# Patient Record
Sex: MALE | Race: AMERICAN INDIAN OR ALASKA NATIVE | Employment: FULL TIME | URBAN - METROPOLITAN AREA
[De-identification: names, ages, dates, MRNs, and addresses within clinical notes are randomized per-mention and may not be internally consistent; named-entity substitution may affect disease eponyms.]

---

## 2017-05-25 ENCOUNTER — EMPLOYEE WELLNESS (OUTPATIENT)
Dept: OTHER | Age: 37
End: 2017-05-25

## 2017-05-25 LAB
CHOLESTEROL, TOTAL: 143 MG/DL (ref 0–199)
FASTING: YES
GLUCOSE BLD-MCNC: 103 MG/DL (ref 74–109)
HDLC SERPL-MCNC: 46 MG/DL (ref 40–90)
LDL CHOLESTEROL CALCULATED: 83 MG/DL
TRIGL SERPL-MCNC: 69 MG/DL (ref 0–199)

## 2018-03-20 VITALS — WEIGHT: 181 LBS

## 2018-05-10 ENCOUNTER — EMPLOYEE WELLNESS (OUTPATIENT)
Dept: OTHER | Age: 38
End: 2018-05-10

## 2018-05-10 LAB
CHOLESTEROL, TOTAL: 144 MG/DL (ref 0–199)
FASTING: YES
GLUCOSE BLD-MCNC: 100 MG/DL (ref 74–109)
HDLC SERPL-MCNC: 47 MG/DL (ref 40–90)
LDL CHOLESTEROL CALCULATED: 86 MG/DL
TRIGL SERPL-MCNC: 54 MG/DL (ref 0–199)

## 2018-05-21 VITALS — WEIGHT: 179 LBS

## 2019-05-15 ENCOUNTER — EMPLOYEE WELLNESS (OUTPATIENT)
Dept: OTHER | Age: 39
End: 2019-05-15

## 2019-05-15 LAB
CHOLESTEROL, TOTAL: 146 MG/DL (ref 0–199)
FASTING: YES
GLUCOSE BLD-MCNC: 108 MG/DL (ref 74–109)
HDLC SERPL-MCNC: 55 MG/DL (ref 40–90)
LDL CHOLESTEROL CALCULATED: 82 MG/DL
TRIGL SERPL-MCNC: 43 MG/DL (ref 0–199)

## 2019-06-03 VITALS — WEIGHT: 177 LBS

## 2020-01-11 ENCOUNTER — HOSPITAL ENCOUNTER (OUTPATIENT)
Dept: CT IMAGING | Age: 40
Discharge: HOME | End: 2020-01-11
Payer: COMMERCIAL

## 2020-01-11 PROCEDURE — 70486 CT MAXILLOFACIAL W/O DYE: CPT

## 2020-04-23 ENCOUNTER — HOSPITAL ENCOUNTER (OUTPATIENT)
Dept: PHYSICAL THERAPY | Age: 40
Setting detail: THERAPIES SERIES
Discharge: HOME | End: 2020-04-23
Payer: COMMERCIAL

## 2020-04-23 PROCEDURE — 97161 PT EVAL LOW COMPLEX 20 MIN: CPT

## 2020-04-23 PROCEDURE — 97035 APP MDLTY 1+ULTRASOUND EA 15: CPT

## 2020-04-23 ASSESSMENT — PAIN DESCRIPTION - DESCRIPTORS: DESCRIPTORS: DULL;ACHING

## 2020-04-23 ASSESSMENT — PAIN DESCRIPTION - LOCATION: LOCATION: GROIN;HIP

## 2020-04-23 ASSESSMENT — PAIN DESCRIPTION - ORIENTATION: ORIENTATION: RIGHT

## 2020-04-23 ASSESSMENT — PAIN DESCRIPTION - PAIN TYPE: TYPE: ACUTE PAIN

## 2020-04-23 ASSESSMENT — PAIN DESCRIPTION - FREQUENCY: FREQUENCY: INTERMITTENT

## 2020-04-23 ASSESSMENT — PAIN SCALES - GENERAL: PAINLEVEL_OUTOF10: 3

## 2020-04-29 ENCOUNTER — HOSPITAL ENCOUNTER (OUTPATIENT)
Dept: PHYSICAL THERAPY | Age: 40
Setting detail: THERAPIES SERIES
Discharge: HOME | End: 2020-04-29
Payer: COMMERCIAL

## 2020-04-29 PROCEDURE — 97035 APP MDLTY 1+ULTRASOUND EA 15: CPT

## 2020-04-29 PROCEDURE — 97140 MANUAL THERAPY 1/> REGIONS: CPT

## 2020-04-29 ASSESSMENT — PAIN DESCRIPTION - ORIENTATION: ORIENTATION: RIGHT

## 2020-04-29 ASSESSMENT — PAIN DESCRIPTION - PAIN TYPE: TYPE: ACUTE PAIN

## 2020-04-29 ASSESSMENT — PAIN SCALES - GENERAL: PAINLEVEL_OUTOF10: 3

## 2020-04-29 ASSESSMENT — PAIN DESCRIPTION - LOCATION: LOCATION: GROIN;HIP

## 2020-04-30 ENCOUNTER — HOSPITAL ENCOUNTER (OUTPATIENT)
Dept: PHYSICAL THERAPY | Age: 40
Setting detail: THERAPIES SERIES
Discharge: HOME | End: 2020-04-30
Payer: COMMERCIAL

## 2020-04-30 PROCEDURE — 97035 APP MDLTY 1+ULTRASOUND EA 15: CPT

## 2020-04-30 PROCEDURE — 97140 MANUAL THERAPY 1/> REGIONS: CPT

## 2020-04-30 ASSESSMENT — PAIN SCALES - GENERAL: PAINLEVEL_OUTOF10: 3

## 2020-04-30 ASSESSMENT — PAIN DESCRIPTION - PAIN TYPE: TYPE: ACUTE PAIN

## 2020-04-30 ASSESSMENT — PAIN DESCRIPTION - ORIENTATION: ORIENTATION: RIGHT

## 2020-04-30 ASSESSMENT — PAIN DESCRIPTION - LOCATION: LOCATION: HIP

## 2020-05-08 ENCOUNTER — HOSPITAL ENCOUNTER (OUTPATIENT)
Dept: PHYSICAL THERAPY | Age: 40
Setting detail: THERAPIES SERIES
Discharge: HOME | End: 2020-05-08
Payer: COMMERCIAL

## 2020-05-08 PROCEDURE — 97110 THERAPEUTIC EXERCISES: CPT

## 2020-05-08 PROCEDURE — 97035 APP MDLTY 1+ULTRASOUND EA 15: CPT

## 2020-05-08 ASSESSMENT — PAIN DESCRIPTION - PAIN TYPE: TYPE: CHRONIC PAIN

## 2020-05-08 ASSESSMENT — PAIN DESCRIPTION - ORIENTATION: ORIENTATION: LEFT

## 2020-05-08 ASSESSMENT — PAIN DESCRIPTION - LOCATION: LOCATION: KNEE

## 2020-05-08 ASSESSMENT — PAIN SCALES - GENERAL: PAINLEVEL_OUTOF10: 5

## 2020-05-13 ENCOUNTER — HOSPITAL ENCOUNTER (OUTPATIENT)
Dept: PHYSICAL THERAPY | Age: 40
Setting detail: THERAPIES SERIES
Discharge: HOME | End: 2020-05-13
Payer: COMMERCIAL

## 2020-05-13 PROCEDURE — 97140 MANUAL THERAPY 1/> REGIONS: CPT

## 2020-05-13 PROCEDURE — 97110 THERAPEUTIC EXERCISES: CPT

## 2020-05-13 PROCEDURE — 97035 APP MDLTY 1+ULTRASOUND EA 15: CPT

## 2020-05-13 ASSESSMENT — PAIN DESCRIPTION - ORIENTATION: ORIENTATION: LEFT

## 2020-05-13 ASSESSMENT — PAIN DESCRIPTION - PAIN TYPE: TYPE: CHRONIC PAIN

## 2020-05-13 ASSESSMENT — PAIN SCALES - GENERAL: PAINLEVEL_OUTOF10: 10

## 2020-05-13 ASSESSMENT — PAIN DESCRIPTION - LOCATION: LOCATION: KNEE

## 2020-05-13 NOTE — PROGRESS NOTES
[place]     Time In: 4816  Time Out: 0740  Minutes: 38  Timed Code Treatment Minutes: 38 Minutes                Date: 2020  Patient Name: Caity Lim,  Gender:  male        CSN: [id number]   : 1980  (35 y.o.)       Referring Practitioner: Dr Doris Hutchison      Diagnosis: M70.61 (ICD-10-CM) - Trochanteric bursitis, right hip  Treatment Diagnosis: right hip pain, difficulty with ambulation, right leg tightness, left knee pain, left leg weakness   Additional Pertinent Hx: No restrictions from doctor. Hx: No significant issues                  General:  PT Visit Information  PT Insurance Information: Medical [place] - No visit limit. Aquatics and modalities covered. Total # of Visits to Date: 5  Plan of Care/Certification Expiration Date: 20  Progress Note Counter:  for PN               Subjective:  Chart Reviewed: Yes  Patient assessed for rehabilitation services?: Yes  Family / Caregiver Present: No  Comments: Follow up with doctor as needed     Subjective: Patient reports was doing well over the weekend and then yesterday started having some increased soreness in left knee and then this morning started having 10/10 in one spot on lateral left knee that is 10/10 and no sure why it is hurting. States no hip pain today. Pain:  Patient Currently in Pain: Yes  Pain Assessment: 0-10  Pain Level: 10  Pain Type: Chronic pain  Pain Location: Knee  Pain Orientation: Left      Objective    Exercises  Exercise 2: 1-2 minutes each psoas release on right at 45 and 90 degree angles - minimal tightness noted and with patient report of pain only at 90 deg angle  Exercise 3: US to left lateral knee - 3.3 MHz 0.8 W/cm2 8 minutes 50% pulsed  Exercise 6: Core/VMO strengthening - abdominal bracing 10x 5 seconds, adduction squeezes 10x5 seconds, glut sets 10x 5 seconds, Abduction with peach band right only 10x.  HEP as can tolerate - adduction squeeze with wall squat and limited range LAQ, bridge with adduction squeeze  Exercise 7: Re-educated on importance of IT band stretch on left. Ice to left knee as able. Exercise 8: Manual therapy with trigger point work to left IT band with knee in flexion and patient in supine - tightness and knotting  noted throughout. Exercise 9: Star pattern technique used with 4 (2 block) strips over lateral distal IT band insertion to help decrease stress on area. Activity Tolerance:  Activity Tolerance: Patient Tolerated treatment well  Activity Tolerance: Patient reports left knee feeling better after session but still painful. Assessment: Body structures, Functions, Activity limitations: Decreased functional mobility , Decreased ROM, Decreased strength, Decreased endurance, Increased pain  Assessment: Patient's right hip pain aboilished today but still had minor pain with psoas release at 90 degree angle. Patient's increased left knee pain could be due to pulling on attachment site of IT band in conjunction with starting strengthening for VMO on same knee. Need to work on pain contorl while patient is doing strengthening and stretching. Prognosis: Good  Discharge Recommendations: Continue to assess pending progress    Patient Education:  Patient Education: Continue with HEP but stop if causing pain. Increase IT band stretch for left leg. Ice as needed. See day does with taping.                       Plan:  Times per week: 2x/week  Plan weeks: 6 weeks  Specific instructions for Next Treatment: US to left knee then hip as needed, manual therapy to IT bands, stretching, strengthening giancarlo left VMO, manual therapy to right psoas  Current Treatment Recommendations: Strengthening, ROM, Functional Mobility Training, Gait Training, Neuromuscular Re-education, Home Exercise Program, Modalities, Manual Therapy - Soft Tissue Mobilization, Pain Management  Plan Comment: Continue with

## 2020-05-14 ENCOUNTER — HOSPITAL ENCOUNTER (OUTPATIENT)
Dept: PHYSICAL THERAPY | Age: 40
Setting detail: THERAPIES SERIES
Discharge: HOME | End: 2020-05-14
Payer: COMMERCIAL

## 2020-05-14 PROCEDURE — 97035 APP MDLTY 1+ULTRASOUND EA 15: CPT

## 2020-05-14 ASSESSMENT — PAIN DESCRIPTION - ORIENTATION: ORIENTATION: LEFT

## 2020-05-14 ASSESSMENT — PAIN DESCRIPTION - LOCATION: LOCATION: KNEE

## 2020-05-14 ASSESSMENT — PAIN DESCRIPTION - PAIN TYPE: TYPE: CHRONIC PAIN

## 2020-05-14 ASSESSMENT — PAIN SCALES - GENERAL: PAINLEVEL_OUTOF10: 7

## 2020-05-14 NOTE — PROGRESS NOTES
[place]     Time In: 730  Time Out: 3789  Minutes: 34  Timed Code Treatment Minutes: 34 Minutes                Date: 2020  Patient Name: Monica Toussaint,  Gender:  male        CSN: [id number]   : 1980  (35 y.o.)       Referring Practitioner: Dr Ronit Johnson      Diagnosis: M70.61 (ICD-10-CM) - Trochanteric bursitis, right hip  Treatment Diagnosis: right hip pain, difficulty with ambulation, right leg tightness, left knee pain, left leg weakness   Additional Pertinent Hx: No restrictions from doctor. Hx: No significant issues                  General:  PT Visit Information  Onset Date: 20  PT Insurance Information: Medical [place] - No visit limit. Aquatics and modalities covered. Total # of Visits to Date: 6  Plan of Care/Certification Expiration Date: 20  Progress Note Counter:  for PN               Subjective:  Chart Reviewed: Yes  Patient assessed for rehabilitation services?: Yes  Family / Caregiver Present: No  Comments: Follow up with doctor as needed  Other (Comment): New script for addition of maltracking of left patella and IT band syndrome left leg     Subjective: Patient reports that thet left lateral knee region has been very flared up. Noting temporary decreased pain symptoms along left lateral knee till the afternoon. But as day went on he felt as if pain was increasing and coming back. He has had to use Aspercreme and Ibuprofen. Last took Ibuprofen 3 tablets 200 mg each this morning at 5:30. Noted no skin irritation from tape.        Pain:  Patient Currently in Pain: Yes  Pain Assessment: 0-10  Pain Level: 7  Pain Type: Chronic pain  Pain Location: Knee  Pain Orientation: Left      Objective    Exercises  Exercise 3: US to left lateral knee, hamstring and IT band insertion lateral tibia and slightly posteriorly.  - 3.3 MHz 0.8 W/cm2 8 minutes 50% pulsed  Exercise 8: Manual therapy

## 2020-05-19 ENCOUNTER — HOSPITAL ENCOUNTER (OUTPATIENT)
Dept: PHYSICAL THERAPY | Age: 40
Setting detail: THERAPIES SERIES
Discharge: HOME | End: 2020-05-19
Payer: COMMERCIAL

## 2020-05-19 PROCEDURE — 97110 THERAPEUTIC EXERCISES: CPT

## 2020-05-19 PROCEDURE — 97035 APP MDLTY 1+ULTRASOUND EA 15: CPT

## 2020-05-19 ASSESSMENT — PAIN DESCRIPTION - PAIN TYPE: TYPE: CHRONIC PAIN

## 2020-05-19 ASSESSMENT — PAIN DESCRIPTION - ORIENTATION: ORIENTATION: RIGHT;LEFT

## 2020-05-19 ASSESSMENT — PAIN DESCRIPTION - LOCATION: LOCATION: HIP;KNEE

## 2020-05-19 ASSESSMENT — PAIN SCALES - GENERAL: PAINLEVEL_OUTOF10: 2

## 2020-05-19 NOTE — PROGRESS NOTES
[place]     Time In: 0700  Time Out: 0730  Minutes: 30  Timed Code Treatment Minutes: 30 Minutes                Date: 2020  Patient Name: Mercedes Lorenzo,  Gender:  male        CSN: [id number]   : 1980  (35 y.o.)       Referring Practitioner: Dr Kimberley Quintanilla      Diagnosis: M70.61 (ICD-10-CM) - Trochanteric bursitis, right hip  Treatment Diagnosis: right hip pain, difficulty with ambulation, right leg tightness, left knee pain, left leg weakness   Additional Pertinent Hx: No restrictions from doctor. Hx: No significant issues                  General:  PT Visit Information  PT Insurance Information: Medical [place] - No visit limit. Aquatics and modalities covered. Total # of Visits to Date: 7  Plan of Care/Certification Expiration Date: 20  Progress Note Counter: 3/8 for PN               Subjective:  Chart Reviewed: Yes  Patient assessed for rehabilitation services?: Yes  Family / Caregiver Present: No  Comments: Follow up with doctor as needed     Subjective: Patient reports is starting to feel better.  talked to doctor last Thursday and he told him was just a flare up of his IT band and to use US and support.  had a very painful weekend and could not do much. States used ice and started feeling better  then yesterday was much better and doing well today. States right hip is a little flared up from putting all his weight through that leg. Pain:  Patient Currently in Pain: Yes  Pain Assessment: 0-10  Pain Level: 2  Pain Type: Chronic pain  Pain Location: Hip, Knee  Pain Orientation: Right, Left      Objective    Exercises  Exercise 2: 1-2 minutes each psoas release on right at 45 and 90 degree angles - moderate tightness noted and with patient report of pain only at 90 deg angle  Exercise 3: US to left lateral knee, hamstring and IT band insertion lateral tibia and slightly posteriorly. - 3.3 MHz 0.8 W/cm2 10 minutes 50% pulsed  Exercise 7: Educated on returning to stretches but hold on strengthening for another couple of days  Exercise 10: Trialed light adduction squeezes with legs straight but caused pain in left hip so stopped. Performed standing HS stretch bilat 3x15 seconds and IT band stretch not crossing leg over the other just lef hip leaning towards the wall 3x15 seconds. Seated right hip flexor stretch (standing caused pain in left knee) 3x15 seconds. Activity Tolerance:  Activity Tolerance: Patient Tolerated treatment well, Patient limited by pain  Activity Tolerance: Patient reported only pain with WB on left after session and was minimal.    Assessment: Body structures, Functions, Activity limitations: Decreased functional mobility , Decreased ROM, Decreased strength, Decreased endurance, Increased pain  Assessment: Patient still with IT band flare up on left side. Need to be careful progressing back to exercises. Slowly work back into stretching and keep working on pain relief mechanisms. Prognosis: Good  Discharge Recommendations: Continue to assess pending progress    Patient Education:  Patient Education: Continue to ice and monitor pain. Do not start back to exercises for a couple more days and only if is pain free. Slowly start stretches. Can take tape off tonight                      Plan:  Times per week: 2x/week  Plan weeks: 6 weeks  Specific instructions for Next Treatment: US to left knee then hip as needed, manual therapy to IT bands, stretching, strengthening giancarlo left VMO, manual therapy to right psoas  Current Treatment Recommendations: Strengthening, ROM, Functional Mobility Training, Gait Training, Neuromuscular Re-education, Home Exercise Program, Modalities, Manual Therapy - Soft Tissue Mobilization, Pain Management  Plan Comment: Continue with POC    Goals:  Patient goals :  To not have the frequency of pain in his right hip/groin and no pain in left

## 2020-05-21 ENCOUNTER — HOSPITAL ENCOUNTER (OUTPATIENT)
Dept: PHYSICAL THERAPY | Age: 40
Setting detail: THERAPIES SERIES
Discharge: HOME | End: 2020-05-21
Payer: COMMERCIAL

## 2020-05-21 PROCEDURE — 97035 APP MDLTY 1+ULTRASOUND EA 15: CPT

## 2020-05-21 PROCEDURE — 97140 MANUAL THERAPY 1/> REGIONS: CPT

## 2020-05-21 ASSESSMENT — PAIN DESCRIPTION - LOCATION: LOCATION: KNEE

## 2020-05-21 ASSESSMENT — PAIN DESCRIPTION - ORIENTATION: ORIENTATION: LEFT

## 2020-05-21 ASSESSMENT — PAIN SCALES - GENERAL: PAINLEVEL_OUTOF10: 3

## 2020-05-21 ASSESSMENT — PAIN DESCRIPTION - PAIN TYPE: TYPE: CHRONIC PAIN

## 2020-05-21 NOTE — PROGRESS NOTES
periods  Short term goal 3: Patient to demonstrate full right IT band range bilaterally for decreased pull on right lateral hip and left knee for ease with walking. Short term goal 4: Patient to demonstrate 4-5/5 strength in left VMO and 5/5 strength in right hip to be able to use reciprocal pattern without popping on the stairs  Short term goal 5: Patient to be fully educated on all stretching and strengthening needed for HEP. Long term goals  Time Frame for Long term goals : 6 weeks  Long term goal 1: Patient to be independent with home program to be able to perform all daily acitivty without increased hip pain.     [address], [address] [place] [address]

## 2020-05-26 ENCOUNTER — HOSPITAL ENCOUNTER (OUTPATIENT)
Dept: PHYSICAL THERAPY | Age: 40
Setting detail: THERAPIES SERIES
Discharge: HOME | End: 2020-05-26
Payer: COMMERCIAL

## 2020-05-26 PROCEDURE — 97110 THERAPEUTIC EXERCISES: CPT

## 2020-05-26 PROCEDURE — 97035 APP MDLTY 1+ULTRASOUND EA 15: CPT

## 2020-05-26 NOTE — PROGRESS NOTES
hamstring and distal IT band x10 minutes 1.0 MHz 1.4 W/cm2 cont to use deep heat to warm up muscle tissue for relaxation of tension. Exercise 5: Supine adduction squeezes and quad sets 10x5 seconds bilat - no pain         Activity Tolerance:  Activity Tolerance: Patient Tolerated treatment well  Activity Tolerance: Patient without pain after session    Assessment: Body structures, Functions, Activity limitations: Decreased functional mobility , Decreased ROM, Decreased strength, Decreased endurance, Increased pain  Assessment: Patient still with tension noted distal IT band but not as tender to touch today. Patient with little to no pain in the last 4 days. Will see how feels after different use of US today and slow return to stretches and strengthening. May benefit from dry needling. Prognosis: Good  Discharge Recommendations: Continue to assess pending progress    Patient Education:  Patient Education: See how feels after treatment today. Slow return to stretches and strengthening and make sure no pain return. Plan:  Times per week: 2x/week  Plan weeks: 6 weeks  Specific instructions for Next Treatment: US to left knee then hip as needed, manual therapy to IT bands, stretching, strengthening giancarlo left VMO, manual therapy to right psoas  Current Treatment Recommendations: Strengthening, ROM, Functional Mobility Training, Gait Training, Neuromuscular Re-education, Home Exercise Program, Modalities, Manual Therapy - Soft Tissue Mobilization, Pain Management  Plan Comment: Continue with POC    Goals:  Patient goals : To not have the frequency of pain in his right hip/groin and no pain in left knee.     Short term goals  Time Frame for Short term goals: 4 weeks  Short term goal 1: Patient to report 50% decrease in right lateral hip pain and 25-50% in left knee for ease with all walking for work  Short term goal 2: Patient to report >50% decrease in right lower abdominal/groin pain for ease with sitting longer time periods  Short term goal 3: Patient to demonstrate full right IT band range bilaterally for decreased pull on right lateral hip and left knee for ease with walking. Short term goal 4: Patient to demonstrate 4-5/5 strength in left VMO and 5/5 strength in right hip to be able to use reciprocal pattern without popping on the stairs  Short term goal 5: Patient to be fully educated on all stretching and strengthening needed for HEP. Long term goals  Time Frame for Long term goals : 6 weeks  Long term goal 1: Patient to be independent with home program to be able to perform all daily acitivty without increased hip pain.     [address], [address] [place] [address] Drive

## 2020-05-28 ENCOUNTER — HOSPITAL ENCOUNTER (OUTPATIENT)
Dept: PHYSICAL THERAPY | Age: 40
Setting detail: THERAPIES SERIES
Discharge: HOME | End: 2020-05-28
Payer: COMMERCIAL

## 2020-05-28 PROCEDURE — 97110 THERAPEUTIC EXERCISES: CPT

## 2020-05-28 PROCEDURE — 97140 MANUAL THERAPY 1/> REGIONS: CPT

## 2020-05-28 NOTE — PROGRESS NOTES
[facility]     Time In: 6687  Time Out: 6660  Minutes: 32  Timed Code Treatment Minutes: 32 Minutes                Date: 2020  Patient Name: Tasha Martinez,  Gender:  male        CSN: [id number]   : 1980  (35 y.o.)       Referring Practitioner: Dr Kassidy Springer      Diagnosis: M70.61 (ICD-10-CM) - Trochanteric bursitis, right hip  Treatment Diagnosis: right hip pain, difficulty with ambulation, right leg tightness, left knee pain, left leg weakness   Additional Pertinent Hx: No restrictions from doctor. Hx: No significant issues                  General:  PT Visit Information  PT Insurance Information: Medical [place] - No visit limit. Aquatics and modalities covered. Total # of Visits to Date: 10  Plan of Care/Certification Expiration Date: 20  Progress Note Counter:  for PN                Subjective:  Chart Reviewed: Yes  Patient assessed for rehabilitation services?: Yes  Family / Caregiver Present: No     Subjective: Patient reports of no longer having left IT band or lateral knee pain LLE. Noting more tenderness at patellar tendon region. Felt US, ice and Ibuprofen helped it the most to calm it down. Noting no right hip discomfort. Pain:  Patient Currently in Pain: No         Objective    Exercises  Exercise 1: Standing stretches: standing with right leg crossed over left and leaning forward 3x15 seconds and with left knee flexion at steps 3x15 seconds. Exercise 2: supinelying: self stretch: hip flexor stretch with leg off treatment table 3x hold 10 count, quad stretch flexing knee 3x hold 10 count,  supine 90/90 hamstring stretch 3x hold 10 count. Exercise 4: self piriformis stretch : left leg crossed over right assist with towel into flexion 3x hold 10 count.     Exercise 8: Manual therapy for release along distal IT band and bicep femoris muscle for 8 minutes in right sidelying, Used biofreeze for the manual soft tissue work. Activity Tolerance:  Activity Tolerance: Patient Tolerated treatment well  Activity Tolerance: Patient without pain after session. Ambulated into clinic and out of clinic without antalgia, good weight shift and ROM with knee. Assessment: Body structures, Functions, Activity limitations: Decreased functional mobility , Decreased ROM, Decreased strength, Decreased endurance, Increased pain  Assessment: Held on US today. Did stretches and manual work. Issued HEP of stretches. No pain other than at left patellar tendon region today. Tolerated manual work along  mid and distal IT band as well as into bicep femoris muscle mid to distally. Note tension and tightness in this area with less trigger point areas. Issued dry needling information to patient. Prognosis: Good  Discharge Recommendations: Continue to assess pending progress    Patient Education:  Patient Education: Issued HEP of hamstring stretch 90/90, IT band stretch, piriformis stretch, hip flexor and quad stretch to patient today. Tolerated ex well. Plan:  Times per week: 2x/week  Plan weeks: 6 weeks  Specific instructions for Next Treatment: US to left knee then hip as needed, manual therapy to IT bands, stretching, strengthening giancarlo left VMO, manual therapy to right psoas  Current Treatment Recommendations: Strengthening, ROM, Functional Mobility Training, Gait Training, Neuromuscular Re-education, Home Exercise Program, Modalities, Manual Therapy - Soft Tissue Mobilization, Pain Management  Plan Comment: Continue with POC    Goals:  Patient goals : To not have the frequency of pain in his right hip/groin and no pain in left knee.     Short term goals  Time Frame for Short term goals: 4 weeks  Short term goal 1: Patient to report 50% decrease in right lateral hip pain and 25-50% in left knee for ease with all walking for work  Short term goal 2: Patient to report >50% decrease in right lower abdominal/groin pain for ease with sitting longer time periods  Short term goal 3: Patient to demonstrate full right IT band range bilaterally for decreased pull on right lateral hip and left knee for ease with walking. Short term goal 4: Patient to demonstrate 4-5/5 strength in left VMO and 5/5 strength in right hip to be able to use reciprocal pattern without popping on the stairs  Short term goal 5: Patient to be fully educated on all stretching and strengthening needed for HEP. Long term goals  Time Frame for Long term goals : 6 weeks  Long term goal 1: Patient to be independent with home program to be able to perform all daily acitivty without increased hip pain.     Sade Barron, [facility]

## 2020-06-02 ENCOUNTER — HOSPITAL ENCOUNTER (OUTPATIENT)
Dept: PHYSICAL THERAPY | Age: 40
Setting detail: THERAPIES SERIES
Discharge: HOME | End: 2020-06-02
Payer: COMMERCIAL

## 2020-06-02 PROCEDURE — 97110 THERAPEUTIC EXERCISES: CPT

## 2020-06-02 NOTE — PROGRESS NOTES
[place]     Time In: 0800  Time Out: 9217  Minutes: 28  Timed Code Treatment Minutes: 28 Minutes                Date: 2020  Patient Name: Farzana Esposito,  Gender:  male        CSN: [id number]   : 1980  (35 y.o.)       Referring Practitioner: Dr Hernandez Sample      Diagnosis: M70.61 (ICD-10-CM) - Trochanteric bursitis, right hip  Treatment Diagnosis: right hip pain, difficulty with ambulation, right leg tightness, left knee pain, left leg weakness   Additional Pertinent Hx: No restrictions from doctor. Hx: No significant issues                  General:  PT Visit Information  PT Insurance Information: Medical [place] - No visit limit. Aquatics and modalities covered. Total # of Visits to Date: 6  Plan of Care/Certification Expiration Date: 20  Progress Note Counter:  for PN                Subjective:  Chart Reviewed: Yes  Patient assessed for rehabilitation services?: Yes  Family / Caregiver Present: No  Comments: Follow up with doctor as needed     Subjective: Patient reports feels is back to baseline. States no hip pain and no lateral left knee pain. States only pain has in inferior patella area and is only with certain activities.  rode his stationary bike and used the elliptical over the weekend and did well.  also walked 2 miles and no problems.  is doing well. Pain:  Patient Currently in Pain: No         Objective    Exercises  Exercise 3: No US today. No tenderness or tightness felt in left IT band. No tightness in psoas at 45 degree angle but did have some pain/tightness at 90 degree angle. Exercise 4: Trialed butterfly stretch and no pain, just tightness  Exercise 5: Seated: adduction squeezes 10x5 seconds and squeeze with small range LAQ 10x bilat - no pain.   Exercise 6: Supine: SLR and SLR with ER 5x bilat and bridging with adduction squeeze - no pain  Exercise 7: Elliptical abdominal/groin pain for ease with sitting longer time periods  Short term goal 3: Patient to demonstrate full right IT band range bilaterally for decreased pull on right lateral hip and left knee for ease with walking. Short term goal 4: Patient to demonstrate 4-5/5 strength in left VMO and 5/5 strength in right hip to be able to use reciprocal pattern without popping on the stairs  Short term goal 5: Patient to be fully educated on all stretching and strengthening needed for HEP. Long term goals  Time Frame for Long term goals : 6 weeks  Long term goal 1: Patient to be independent with home program to be able to perform all daily acitivty without increased hip pain.     [address], [address] [place] [address] Drive

## 2020-06-03 ENCOUNTER — APPOINTMENT (OUTPATIENT)
Dept: PHYSICAL THERAPY | Age: 40
End: 2020-06-03
Payer: COMMERCIAL

## 2020-06-04 ENCOUNTER — HOSPITAL ENCOUNTER (OUTPATIENT)
Dept: PHYSICAL THERAPY | Age: 40
Setting detail: THERAPIES SERIES
Discharge: HOME | End: 2020-06-04
Payer: COMMERCIAL

## 2020-06-04 PROCEDURE — 97110 THERAPEUTIC EXERCISES: CPT

## 2020-06-10 ENCOUNTER — HOSPITAL ENCOUNTER (OUTPATIENT)
Dept: PHYSICAL THERAPY | Age: 40
Setting detail: THERAPIES SERIES
Discharge: HOME | End: 2020-06-10
Payer: COMMERCIAL

## 2020-06-10 PROCEDURE — 97110 THERAPEUTIC EXERCISES: CPT

## 2020-06-10 NOTE — DISCHARGE SUMMARY
[email or web address]     Time In: 8823  Time Out: 3555  Minutes: 38  Timed Code Treatment Minutes: 45 Minutes           Discharge    Date: 6/10/2020  Patient Name: Bryce Rutledge,  Gender:  male        CSN: [id number]   : 1980  (35 y.o.)       Referring Practitioner: Dr Pat Sanchez      Diagnosis: M70.61 (ICD-10-CM) - Trochanteric bursitis, right hip  Treatment Diagnosis: right hip pain, difficulty with ambulation, right leg tightness, left knee pain, left leg weakness   Additional Pertinent Hx: No restrictions from doctor. Hx: No significant issues                  General:  PT Visit Information  PT Insurance Information: Medical [place] - No visit limit. Aquatics and modalities covered. Total # of Visits to Date: 15  Plan of Care/Certification Expiration Date: 20  Progress Note Counter:   for PN on 6-               Subjective:  Chart Reviewed: Yes  Patient assessed for rehabilitation services?: Yes  Family / Caregiver Present: No  Comments: Follow up with doctor as needed     Subjective: Patient reports his right hip is good and not really feeling anything. Patient reports left knee pain is back to the original pain he was having.  still feels some clicking/grinding under his patella but no lateral knee pain. States is back to doing his exercises and using his exercise equipment without any pain.  does not feel needs any more therapy and he can continue at home. Pain:  Patient Currently in Pain: No         Objective    Exercises  Exercise 11: Discussed HEP and educated on what to continue with. Discussed knee braces and what might be beneficial.          Activity Tolerance:  Activity Tolerance: Patient Tolerated treatment well    Assessment:  Assessment: Patient has met all goals for his hip and alomst all goals for his knee and has made great progress with therapy.  He is back to original

## 2020-10-02 NOTE — FLOWSHEET NOTE
bothers him when he is walking. States has chronic left knee pain htat is seeing ortho doctor for. [facility] doctor thought hip pain may be due to bursitis. Patient also reports having a pain in his right groin/lower abominal area. Pain:  Patient Currently in Pain: Yes  Pain Assessment: 0-10  Pain Level: 3(Has been up to 5-6/10)  Pain Type: Acute pain  Pain Location: Groin, Hip  Pain Orientation: Right  Pain Descriptors: Dull, Aching  Pain Frequency: Intermittent     Social/Functional History:    Type of Home: House  Home Layout: Two level, Bed/Bath upstairs  Home Access: Stairs to enter with rails  Entrance Stairs - Number of Steps: 3 steps  Home Equipment: (No use of AD)             ADL Assistance: Independent  Homemaking Assistance: Independent  Ambulation Assistance: Independent  Transfer Assistance: Independent    Active : Yes  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 Moderate to severe tenderness right psoas, mild on left  Observation: No leg length difference noted. Patient walks on lateral side of right foot and reports wife has stated that he wears down the outside of his right shoe within 6 months. Edema: None noted        Lumbar: Flexion WNL    ROM RLE: Leg raise to 60 degrees with some tightness, hip flexion and IR/ER all WNL, IT band tightness  ROM LLE: Leg raise to 60 degrees with some less tightness, hip flexion and IR/ER all WNL. IT band WNL    Comment: Right leg 4+-5/5 throughout    Comment: Left leg 4+5/5 throughout with some knee pain.  Did not check VMO      Overall Sensation Status: WNL              Ambulation 1  Surface: carpet  Device: No Device  Assistance: Independent  Quality of Gait: Fairly normal step length and speed, walks on lateral restrictions; using standardized tests and measures - High Complexity: 4 or more body structures and functional, activity limitations and/or participation restrictions. See restrictions and objective section above for details. Clinical Presentation: Moderate - Evolving with Changing Characteristics: Patient with right hip pain that is getting worse    Decision Making: Moderate Complexity due to Patient with multiple issues and not sure which is causing his hip pain. Decision making was based on patient assessment and decision making process in determining plan of care and establishing reasonable expectations for measurable functional outcomes. Evaluation Complexity: Based on the findings of patient history, examination, clinical presentation, and decision making during this evaluation, the evaluation of Alcides Hightower  is of low complexity. Goals:  Patient goals : To not have the frequency of pain in his right hip/groin. Short term goals  Time Frame for Short term goals: 4 weeks  Short term goal 1: Patient to report 25-50% decrease in right lateral hip pain for ease with all walking for work  Short term goal 2: Patient to report 25-50% decrease in right lower abdominal/groin pain for ease with sitting longer time periods  Short term goal 3: Patient to demonstrate full right IT band range for decreased pull on right lateral hip for ease with walking. Short term goal 4: Patient to be educated on any needed strengthening for legs to ehlp with stbaility at hip with daily activity    Long term goals  Time Frame for Long term goals : 8 weeks  Long term goal 1: Patient to be independent with home program to be able to perform all daily acitivty without increased hip pain.     [address], [address] Griseo[employer]vin Pregnancy And Lactation Text: This medication is Pregnancy Category X and is known to cause serious birth defects. It is unknown if this medication is excreted in breast milk but breast feeding should be avoided.

## 2021-08-11 LAB
CHOLESTEROL, TOTAL: 141 MG/DL (ref 0–199)
GLUCOSE BLD-MCNC: 77 MG/DL (ref 74–109)
HDLC SERPL-MCNC: 44 MG/DL (ref 40–90)
LDL CHOLESTEROL CALCULATED: 86 MG/DL
TRIGL SERPL-MCNC: 55 MG/DL (ref 0–199)

## 2022-05-18 ENCOUNTER — HOSPITAL ENCOUNTER (OUTPATIENT)
Dept: MRI IMAGING | Age: 42
Discharge: HOME | End: 2022-05-18
Payer: COMMERCIAL

## 2022-05-18 DIAGNOSIS — M25.562 LEFT KNEE PAIN, UNSPECIFIED CHRONICITY: ICD-10-CM

## 2022-05-18 PROCEDURE — 73721 MRI JNT OF LWR EXTRE W/O DYE: CPT

## 2022-05-24 ENCOUNTER — HOSPITAL ENCOUNTER (OUTPATIENT)
Dept: MRI IMAGING | Age: 42
Discharge: HOME | End: 2022-05-24

## 2022-05-24 DIAGNOSIS — Z00.6 EXAMINATION FOR NORMAL COMPARISON FOR CLINICAL RESEARCH: ICD-10-CM

## 2022-05-27 ENCOUNTER — HOSPITAL ENCOUNTER (OUTPATIENT)
Dept: MRI IMAGING | Age: 42
Discharge: HOME | End: 2022-05-27
Payer: COMMERCIAL

## 2022-05-27 DIAGNOSIS — M25.551 RIGHT HIP PAIN: ICD-10-CM

## 2022-05-27 PROCEDURE — 73721 MRI JNT OF LWR EXTRE W/O DYE: CPT

## 2022-07-29 LAB
CHOLESTEROL, TOTAL: 156 MG/DL (ref 0–199)
FASTING: YES
GLUCOSE BLD-MCNC: 105 MG/DL (ref 74–109)
HDLC SERPL-MCNC: 50 MG/DL (ref 40–90)
LDL CHOLESTEROL CALCULATED: 94 MG/DL
TRIGL SERPL-MCNC: 62 MG/DL (ref 0–199)

## 2022-12-13 ENCOUNTER — TRANSCRIBE ORDERS (OUTPATIENT)
Dept: ADMINISTRATIVE | Age: 42
End: 2022-12-13

## 2022-12-13 DIAGNOSIS — M25.562 LEFT KNEE PAIN, UNSPECIFIED CHRONICITY: Primary | ICD-10-CM

## 2022-12-15 ENCOUNTER — HOSPITAL ENCOUNTER (OUTPATIENT)
Dept: MRI IMAGING | Age: 42
Discharge: HOME | End: 2022-12-15
Payer: COMMERCIAL

## 2022-12-15 DIAGNOSIS — M25.562 LEFT KNEE PAIN, UNSPECIFIED CHRONICITY: ICD-10-CM

## 2022-12-15 PROCEDURE — 73721 MRI JNT OF LWR EXTRE W/O DYE: CPT

## 2023-07-28 LAB
CHOLEST SERPL-MCNC: 149 MG/DL (ref 0–199)
FASTING: YES
GLUCOSE SERPL-MCNC: 99 MG/DL (ref 74–109)
HDLC SERPL-MCNC: 54 MG/DL (ref 40–90)
LDLC SERPL CALC-MCNC: 86 MG/DL
TRIGL SERPL-MCNC: 46 MG/DL (ref 0–199)

## 2024-08-03 ENCOUNTER — LAB (OUTPATIENT)
Dept: LAB | Age: 44
End: 2024-08-03

## 2024-08-03 LAB
ALBUMIN SERPL BCG-MCNC: 4.3 G/DL (ref 3.5–5.1)
ALP SERPL-CCNC: 82 U/L (ref 38–126)
ALT SERPL W/O P-5'-P-CCNC: 15 U/L (ref 11–66)
ANION GAP SERPL CALC-SCNC: 10 MEQ/L (ref 8–16)
AST SERPL-CCNC: 17 U/L (ref 5–40)
BASOPHILS ABSOLUTE: 0 THOU/MM3 (ref 0–0.1)
BASOPHILS NFR BLD AUTO: 0.6 %
BILIRUB SERPL-MCNC: 0.3 MG/DL (ref 0.3–1.2)
BILIRUB UR QL STRIP: NEGATIVE
BUN SERPL-MCNC: 10 MG/DL (ref 7–22)
CALCIUM SERPL-MCNC: 8.8 MG/DL (ref 8.5–10.5)
CHARACTER UR: CLEAR
CHLORIDE SERPL-SCNC: 106 MEQ/L (ref 98–111)
CHOLEST SERPL-MCNC: 125 MG/DL (ref 100–199)
CO2 SERPL-SCNC: 24 MEQ/L (ref 23–33)
COLOR UR: YELLOW
CREAT SERPL-MCNC: 0.7 MG/DL (ref 0.4–1.2)
DEPRECATED RDW RBC AUTO: 44 FL (ref 35–45)
EOSINOPHIL NFR BLD AUTO: 6.6 %
EOSINOPHILS ABSOLUTE: 0.4 THOU/MM3 (ref 0–0.4)
ERYTHROCYTE [DISTWIDTH] IN BLOOD BY AUTOMATED COUNT: 12.8 % (ref 11.5–14.5)
GFR SERPL CREATININE-BSD FRML MDRD: > 90 ML/MIN/1.73M2
GLUCOSE SERPL-MCNC: 106 MG/DL (ref 70–108)
GLUCOSE UR QL STRIP.AUTO: NEGATIVE MG/DL
HCT VFR BLD AUTO: 43.6 % (ref 42–52)
HDLC SERPL-MCNC: 47 MG/DL
HGB BLD-MCNC: 14.3 GM/DL (ref 14–18)
HGB UR QL STRIP.AUTO: NEGATIVE
IMM GRANULOCYTES # BLD AUTO: 0.01 THOU/MM3 (ref 0–0.07)
IMM GRANULOCYTES NFR BLD AUTO: 0.2 %
KETONES UR QL STRIP.AUTO: NEGATIVE
LDLC SERPL CALC-MCNC: 64 MG/DL
LEUKOCYTE ESTERASE UR QL STRIP.AUTO: NEGATIVE
LYMPHOCYTES ABSOLUTE: 1.5 THOU/MM3 (ref 1–4.8)
LYMPHOCYTES NFR BLD AUTO: 27.4 %
MCH RBC QN AUTO: 31 PG (ref 26–33)
MCHC RBC AUTO-ENTMCNC: 32.8 GM/DL (ref 32.2–35.5)
MCV RBC AUTO: 94.6 FL (ref 80–94)
MONOCYTES ABSOLUTE: 0.5 THOU/MM3 (ref 0.4–1.3)
MONOCYTES NFR BLD AUTO: 9.9 %
NEUTROPHILS ABSOLUTE: 3 THOU/MM3 (ref 1.8–7.7)
NEUTROPHILS NFR BLD AUTO: 55.3 %
NITRITE UR QL STRIP.AUTO: NEGATIVE
NRBC BLD AUTO-RTO: 0 /100 WBC
PH UR STRIP.AUTO: 6.5 [PH] (ref 5–9)
PLATELET # BLD AUTO: 199 THOU/MM3 (ref 130–400)
PMV BLD AUTO: 11 FL (ref 9.4–12.4)
POTASSIUM SERPL-SCNC: 4.5 MEQ/L (ref 3.5–5.2)
PROT SERPL-MCNC: 6.9 G/DL (ref 6.1–8)
PROT UR STRIP.AUTO-MCNC: NEGATIVE MG/DL
RBC # BLD AUTO: 4.61 MILL/MM3 (ref 4.7–6.1)
SODIUM SERPL-SCNC: 140 MEQ/L (ref 135–145)
SP GR UR REFRACT.AUTO: 1.02 (ref 1–1.03)
TRIGL SERPL-MCNC: 70 MG/DL (ref 0–199)
TSH SERPL DL<=0.005 MIU/L-ACNC: 1.56 UIU/ML (ref 0.4–4.2)
UROBILINOGEN UR QL STRIP.AUTO: 0.2 EU/DL (ref 0–1)
WBC # BLD AUTO: 5.4 THOU/MM3 (ref 4.8–10.8)

## 2024-08-08 LAB — DEPRECATED CALCIDIOL+CALCIFEROL SERPL-MC: NORMAL NG/ML

## 2024-08-15 ENCOUNTER — OFFICE VISIT (OUTPATIENT)
Dept: NEUROLOGY | Age: 44
End: 2024-08-15
Payer: COMMERCIAL

## 2024-08-15 ENCOUNTER — HOSPITAL ENCOUNTER (OUTPATIENT)
Dept: GENERAL RADIOLOGY | Age: 44
Discharge: HOME | End: 2024-08-15
Attending: RADIOLOGY

## 2024-08-15 VITALS
DIASTOLIC BLOOD PRESSURE: 76 MMHG | HEART RATE: 62 BPM | HEIGHT: 70 IN | BODY MASS INDEX: 23.77 KG/M2 | WEIGHT: 166 LBS | SYSTOLIC BLOOD PRESSURE: 119 MMHG | OXYGEN SATURATION: 97 %

## 2024-08-15 DIAGNOSIS — R29.2 HYPERREFLEXIA: ICD-10-CM

## 2024-08-15 DIAGNOSIS — R29.898 RIGHT ARM WEAKNESS: ICD-10-CM

## 2024-08-15 DIAGNOSIS — Z00.6 ENCOUNTER FOR EXAMINATION FOR NORMAL COMPARISON OR CONTROL IN CLINICAL RESEARCH PROGRAM: ICD-10-CM

## 2024-08-15 DIAGNOSIS — M54.2 NECK PAIN: Primary | ICD-10-CM

## 2024-08-15 PROCEDURE — 99205 OFFICE O/P NEW HI 60 MIN: CPT | Performed by: PSYCHIATRY & NEUROLOGY

## 2024-08-15 RX ORDER — OXCARBAZEPINE 150 MG/1
150 TABLET, FILM COATED ORAL DAILY
Qty: 30 TABLET | Refills: 3 | Status: SHIPPED | OUTPATIENT
Start: 2024-08-15

## 2024-08-15 RX ORDER — METHYLPREDNISOLONE 4 MG/1
4 TABLET ORAL SEE ADMIN INSTRUCTIONS
COMMUNITY

## 2024-08-15 NOTE — PATIENT INSTRUCTIONS
MRI cervical spine WO contrast  Start Trileptal 150 mg daily   Will order connective tissue screen including KUNAL, rheumatoid factor, antidouble-stranded DNA, anti-SSA, anti-SSB in addition to serum protein electrophoresis and sed rate.  Consider referral to spine specialist as a next step  Call with any new symptoms or concerns.   Follow up as needed

## 2024-08-17 ENCOUNTER — LAB (OUTPATIENT)
Dept: LAB | Age: 44
End: 2024-08-17

## 2024-08-17 DIAGNOSIS — M54.2 NECK PAIN: ICD-10-CM

## 2024-08-17 DIAGNOSIS — R29.2 HYPERREFLEXIA: ICD-10-CM

## 2024-08-17 DIAGNOSIS — R29.898 RIGHT ARM WEAKNESS: ICD-10-CM

## 2024-08-17 LAB
CRP SERPL-MCNC: 0.69 MG/DL (ref 0–1)
ERYTHROCYTE [SEDIMENTATION RATE] IN BLOOD BY WESTERGREN METHOD: 7 MM/HR (ref 0–10)
RHEUMATOID FACT SERPL-ACNC: < 10 IU/ML (ref 0–13)

## 2024-08-21 LAB
DSDNA IGG SER QL IA: NORMAL
ENA SS-B IGG SER IA-ACNC: 1 AU/ML (ref 0–40)
NUCLEAR IGG SER QL IA: NORMAL

## 2024-08-22 LAB — PROTEIN ELECTROPHORESIS, SERUM: NORMAL

## 2024-08-26 ENCOUNTER — HOSPITAL ENCOUNTER (OUTPATIENT)
Dept: MRI IMAGING | Age: 44
Discharge: HOME | End: 2024-08-26
Payer: COMMERCIAL

## 2024-08-26 DIAGNOSIS — R29.2 HYPERREFLEXIA: ICD-10-CM

## 2024-08-26 DIAGNOSIS — M54.2 NECK PAIN: ICD-10-CM

## 2024-08-26 DIAGNOSIS — R29.898 RIGHT ARM WEAKNESS: ICD-10-CM

## 2024-08-26 PROCEDURE — 72141 MRI NECK SPINE W/O DYE: CPT

## 2024-08-29 ENCOUNTER — PROCEDURE VISIT (OUTPATIENT)
Dept: NEUROLOGY | Age: 44
End: 2024-08-29
Payer: COMMERCIAL

## 2024-08-29 DIAGNOSIS — R20.0 RIGHT ARM NUMBNESS: Primary | ICD-10-CM

## 2024-08-29 DIAGNOSIS — M54.12 CERVICAL RADICULOPATHY: ICD-10-CM

## 2024-08-29 DIAGNOSIS — R29.898 RIGHT HAND WEAKNESS: ICD-10-CM

## 2024-08-29 DIAGNOSIS — G56.01 CARPAL TUNNEL SYNDROME, RIGHT: ICD-10-CM

## 2024-08-29 PROCEDURE — 95886 MUSC TEST DONE W/N TEST COMP: CPT | Performed by: PSYCHIATRY & NEUROLOGY

## 2024-08-29 PROCEDURE — 95910 NRV CNDJ TEST 7-8 STUDIES: CPT | Performed by: PSYCHIATRY & NEUROLOGY

## 2025-06-06 LAB
CHOLEST SERPL-MCNC: 149 MG/DL (ref 0–199)
FASTING: YES
GLUCOSE SERPL-MCNC: 99 MG/DL (ref 74–109)
HDLC SERPL-MCNC: 53 MG/DL (ref 40–90)
LDLC SERPL CALC-MCNC: 87 MG/DL
TRIGL SERPL-MCNC: 45 MG/DL (ref 0–199)